# Patient Record
Sex: MALE | Race: WHITE
[De-identification: names, ages, dates, MRNs, and addresses within clinical notes are randomized per-mention and may not be internally consistent; named-entity substitution may affect disease eponyms.]

---

## 2021-08-10 ENCOUNTER — HOSPITAL ENCOUNTER (OUTPATIENT)
Dept: HOSPITAL 53 - M RAD | Age: 26
End: 2021-08-10
Attending: PHYSICIAN ASSISTANT
Payer: COMMERCIAL

## 2021-08-10 DIAGNOSIS — M77.31: Primary | ICD-10-CM

## 2021-08-10 NOTE — REP
INDICATION:

R ACHILLES TENDON PAIN.



COMPARISON:

None.



TECHNIQUE:

Two views



FINDINGS:

There is no acute fracture or destructive osseous lesion.  There is a tiny

retrocalcaneal heel spur.



IMPRESSION:

No acute abnormality.  There is a tiny retrocalcaneal heel spur.  If an Achilles

tendon abnormality is of clinical concern then an MRI is recommended





<Electronically signed by Aric Newman > 08/10/21 [id number]

## 2022-05-30 ENCOUNTER — HOSPITAL ENCOUNTER (EMERGENCY)
Dept: HOSPITAL 53 - M ED | Age: 27
LOS: 1 days | Discharge: HOME | End: 2022-05-31
Payer: COMMERCIAL

## 2022-05-30 VITALS — HEIGHT: 74 IN | BODY MASS INDEX: 35.37 KG/M2 | WEIGHT: 275.58 LBS

## 2022-05-30 DIAGNOSIS — J18.9: Primary | ICD-10-CM

## 2022-05-30 DIAGNOSIS — F17.210: ICD-10-CM

## 2022-05-30 LAB
ALBUMIN SERPL BCG-MCNC: 3.9 GM/DL (ref 3.2–5.2)
ALT SERPL W P-5'-P-CCNC: 43 U/L (ref 12–78)
BASOPHILS # BLD AUTO: 0.1 10^3/UL (ref 0–0.2)
BASOPHILS NFR BLD AUTO: 0.9 % (ref 0–1)
BILIRUB CONJ SERPL-MCNC: 0.2 MG/DL (ref 0–0.2)
BILIRUB SERPL-MCNC: 0.9 MG/DL (ref 0.2–1)
EOSINOPHIL # BLD AUTO: 0.1 10^3/UL (ref 0–0.5)
EOSINOPHIL NFR BLD AUTO: 0.6 % (ref 0–3)
HCT VFR BLD AUTO: 50.6 % (ref 42–52)
HGB BLD-MCNC: 17.6 G/DL (ref 13.5–17.5)
LIPASE SERPL-CCNC: 138 U/L (ref 73–393)
LYMPHOCYTES # BLD AUTO: 1.4 10^3/UL (ref 1.5–5)
LYMPHOCYTES NFR BLD AUTO: 11.8 % (ref 24–44)
MCH RBC QN AUTO: 29.6 PG (ref 27–33)
MCHC RBC AUTO-ENTMCNC: 34.8 G/DL (ref 32–36.5)
MCV RBC AUTO: 85.2 FL (ref 80–96)
MONOCYTES # BLD AUTO: 0.8 10^3/UL (ref 0–0.8)
MONOCYTES NFR BLD AUTO: 6.5 % (ref 2–8)
NEUTROPHILS # BLD AUTO: 9.2 10^3/UL (ref 1.5–8.5)
NEUTROPHILS NFR BLD AUTO: 78.9 % (ref 36–66)
PLATELET # BLD AUTO: 282 10^3/UL (ref 150–450)
PROT SERPL-MCNC: 7.2 GM/DL (ref 6.4–8.2)
RBC # BLD AUTO: 5.94 10^6/UL (ref 4.3–6.1)
RSV RNA NPH QL NAA+PROBE: NEGATIVE
WBC # BLD AUTO: 11.6 10^3/UL (ref 4–10)

## 2022-05-30 PROCEDURE — 83690 ASSAY OF LIPASE: CPT

## 2022-05-30 PROCEDURE — 86803 HEPATITIS C AB TEST: CPT

## 2022-05-30 PROCEDURE — 80047 BASIC METABLC PNL IONIZED CA: CPT

## 2022-05-30 PROCEDURE — 85025 COMPLETE CBC W/AUTO DIFF WBC: CPT

## 2022-05-30 PROCEDURE — 96375 TX/PRO/DX INJ NEW DRUG ADDON: CPT

## 2022-05-30 PROCEDURE — 96374 THER/PROPH/DIAG INJ IV PUSH: CPT

## 2022-05-30 PROCEDURE — 96361 HYDRATE IV INFUSION ADD-ON: CPT

## 2022-05-30 PROCEDURE — 86705 HEP B CORE ANTIBODY IGM: CPT

## 2022-05-30 PROCEDURE — 87631 RESP VIRUS 3-5 TARGETS: CPT

## 2022-05-30 PROCEDURE — 87340 HEPATITIS B SURFACE AG IA: CPT

## 2022-05-30 PROCEDURE — 74177 CT ABD & PELVIS W/CONTRAST: CPT

## 2022-05-30 PROCEDURE — 86709 HEPATITIS A IGM ANTIBODY: CPT

## 2022-05-30 PROCEDURE — 71046 X-RAY EXAM CHEST 2 VIEWS: CPT

## 2022-05-30 PROCEDURE — 80076 HEPATIC FUNCTION PANEL: CPT

## 2022-05-30 PROCEDURE — 99284 EMERGENCY DEPT VISIT MOD MDM: CPT

## 2022-05-30 PROCEDURE — 71250 CT THORAX DX C-: CPT

## 2022-05-31 VITALS — SYSTOLIC BLOOD PRESSURE: 165 MMHG | DIASTOLIC BLOOD PRESSURE: 81 MMHG

## 2022-05-31 LAB
HBV CORE IGM SER QL: NEGATIVE
HBV SURFACE AB SER-ACNC: NEGATIVE M[IU]/ML
HCV AB SER QL: 0.1 INDEX (ref ?–0.8)

## 2023-02-23 ENCOUNTER — HOSPITAL ENCOUNTER (OUTPATIENT)
Dept: HOSPITAL 53 - M PLAIMG | Age: 28
End: 2023-02-23
Attending: INTERNAL MEDICINE

## 2023-02-23 DIAGNOSIS — R52: Primary | ICD-10-CM

## 2023-04-20 ENCOUNTER — HOSPITAL ENCOUNTER (OUTPATIENT)
Dept: HOSPITAL 53 - M RAD | Age: 28
End: 2023-04-20
Attending: PHYSICAL MEDICINE & REHABILITATION
Payer: COMMERCIAL

## 2023-04-20 DIAGNOSIS — M51.36: Primary | ICD-10-CM

## 2023-04-20 PROCEDURE — 78803 RP LOCLZJ TUM SPECT 1 AREA: CPT
